# Patient Record
Sex: FEMALE | Race: OTHER
[De-identification: names, ages, dates, MRNs, and addresses within clinical notes are randomized per-mention and may not be internally consistent; named-entity substitution may affect disease eponyms.]

---

## 2019-04-02 ENCOUNTER — HOSPITAL ENCOUNTER (OUTPATIENT)
Dept: HOSPITAL 72 - PAN | Age: 44
Discharge: HOME | End: 2019-04-02
Payer: COMMERCIAL

## 2019-04-02 VITALS — HEIGHT: 64 IN | BODY MASS INDEX: 22.02 KG/M2 | WEIGHT: 129 LBS

## 2019-04-02 VITALS — SYSTOLIC BLOOD PRESSURE: 111 MMHG | DIASTOLIC BLOOD PRESSURE: 79 MMHG

## 2019-04-02 DIAGNOSIS — K21.9: ICD-10-CM

## 2019-04-02 DIAGNOSIS — R10.9: Primary | ICD-10-CM

## 2019-04-02 DIAGNOSIS — K62.5: ICD-10-CM

## 2019-04-02 PROCEDURE — 99202 OFFICE O/P NEW SF 15 MIN: CPT

## 2019-04-02 NOTE — CONSULTATION
DATE OF CONSULTATION:  2019

CONSULTING PHYSICIAN:  Richar Matos M.D.



CHIEF COMPLAINT:  Abdominal pain and rectal bleeding.



HISTORY OF PRESENT ILLNESS:  This is a 43-year-old female with past medical

history of chronic GERD, abdominal pain for about 10 years.  Apparently,

she had an abdominal ultrasound outside.  I do not have the report.  The

patient was told she has gallstones.  Apparently, she had also endoscopy

and she was told she had some gastric polyps in the past.  Again, I do not

have any of the report.  This is all per the patient.  The patient was

referred to us for evaluation of above.  She is still having complaint of

abdominal pain in epigastric area and right upper quadrant, sometimes it

wakes her up at night, associated with nausea, worse with eating.



PAST MEDICAL HISTORY:

1. GERD.

2. Gallstones.



PAST SURGICAL HISTORY:  .



MEDICATIONS:  Omeprazole.



FAMILY HISTORY:  No family history of GI malignancies.



SOCIAL HISTORY:  The patient denies any tobacco, alcohol, or IV drug

abuse.



ALLERGIES:  No known drug allergies.



REVIEW OF SYSTEMS:  A 10-point review of systems was performed.  Pertinent

positives are in HPI.



PHYSICAL EXAMINATION:

VITAL SIGNS:  Temperature 98.4, pulse is 71, respirations 20, and blood

pressure is 111/79.

HEENT:  Normocephalic and atraumatic.  Sclerae anicteric.

NECK:  Supple.  No evidence of obvious lymphadenopathy.

CARDIOVASCULAR:  Regular rhythm.  Plus S1 and S2.  No obvious

murmur.

LUNGS:  Clear to auscultation bilaterally.

ABDOMEN:  Positive bowel sounds.  Soft, nontender.  No rebound.  No

guarding.  No peritoneal sign.

EXTREMITIES:  No cyanosis.  No clubbing.  No edema.



ASSESSMENT AND PLAN:

1. Abdominal pain, most probably secondary to gallstones, classical,

with worsening with meals, waking her up at night, epigastric and right

upper quadrant, associated with meals and nausea.  We will recommend the

patient to be evaluated by Surgery for possible cholecystectomy.

2. Rectal bleeding.  It seems to be from hemorrhoids.  It is not

constant.  It comes and goes.  The patient had this bleeding once or twice

a month and this is when she wipes herself and it is not in the toilet nor

in the stool.  It is basically when she wipes herself.  She denies any

constipation or hard stools.  The patient was given prescription for

Anusol-HC cream to apply when she gets bleeding and she was instructed to

come back if there is no improvement.









  ______________________________________________

  Richar Matos M.D.





DR:  KALYAN

D:  2019 09:42

T:  2019 19:30

JOB#:  [id number]

CC: